# Patient Record
Sex: MALE | Race: WHITE | NOT HISPANIC OR LATINO
[De-identification: names, ages, dates, MRNs, and addresses within clinical notes are randomized per-mention and may not be internally consistent; named-entity substitution may affect disease eponyms.]

---

## 2017-01-05 ENCOUNTER — APPOINTMENT (OUTPATIENT)
Dept: GASTROENTEROLOGY | Facility: CLINIC | Age: 47
End: 2017-01-05

## 2017-01-19 ENCOUNTER — APPOINTMENT (OUTPATIENT)
Dept: GASTROENTEROLOGY | Facility: AMBULATORY MEDICAL SERVICES | Age: 47
End: 2017-01-19

## 2017-03-01 ENCOUNTER — APPOINTMENT (OUTPATIENT)
Dept: GASTROENTEROLOGY | Facility: CLINIC | Age: 47
End: 2017-03-01

## 2017-03-20 ENCOUNTER — RX RENEWAL (OUTPATIENT)
Age: 47
End: 2017-03-20

## 2018-01-02 ENCOUNTER — EMERGENCY (EMERGENCY)
Facility: HOSPITAL | Age: 48
LOS: 1 days | Discharge: HOME | End: 2018-01-02
Attending: EMERGENCY MEDICINE | Admitting: EMERGENCY MEDICINE
Payer: MEDICARE

## 2018-01-02 VITALS
TEMPERATURE: 99 F | HEIGHT: 68 IN | OXYGEN SATURATION: 96 % | WEIGHT: 190.04 LBS | RESPIRATION RATE: 20 BRPM | SYSTOLIC BLOOD PRESSURE: 152 MMHG | HEART RATE: 74 BPM | DIASTOLIC BLOOD PRESSURE: 85 MMHG

## 2018-01-02 VITALS
RESPIRATION RATE: 16 BRPM | TEMPERATURE: 98 F | OXYGEN SATURATION: 98 % | DIASTOLIC BLOOD PRESSURE: 74 MMHG | SYSTOLIC BLOOD PRESSURE: 129 MMHG | HEART RATE: 73 BPM

## 2018-01-02 PROCEDURE — 99283 EMERGENCY DEPT VISIT LOW MDM: CPT | Mod: 25

## 2018-01-02 PROCEDURE — 99283 EMERGENCY DEPT VISIT LOW MDM: CPT

## 2018-01-02 PROCEDURE — 96372 THER/PROPH/DIAG INJ SC/IM: CPT

## 2018-01-02 RX ORDER — ATORVASTATIN CALCIUM 80 MG/1
1 TABLET, FILM COATED ORAL
Qty: 0 | Refills: 0 | COMMUNITY

## 2018-01-02 RX ORDER — LISINOPRIL 2.5 MG/1
1 TABLET ORAL
Qty: 0 | Refills: 0 | COMMUNITY

## 2018-01-02 RX ORDER — MELOXICAM 15 MG/1
1 TABLET ORAL
Qty: 0 | Refills: 0 | COMMUNITY

## 2018-01-02 RX ORDER — KETOROLAC TROMETHAMINE 30 MG/ML
60 SYRINGE (ML) INJECTION ONCE
Qty: 0 | Refills: 0 | Status: DISCONTINUED | OUTPATIENT
Start: 2018-01-02 | End: 2018-01-02

## 2018-01-02 RX ADMIN — Medication 60 MILLIGRAM(S): at 10:46

## 2018-01-02 RX ADMIN — Medication 60 MILLIGRAM(S): at 11:16

## 2018-01-02 NOTE — ED PROVIDER NOTE - MEDICAL DECISION MAKING DETAILS
46 yo m with chronic back pain, relief with epidural inj 1&1/2 yrs ago, here with lbp x 4 days, worse since last night, no relief with meloxicam/aleve; no appt with pain management till 1/12; will give pain meds here, call pain management, re-assess

## 2018-01-02 NOTE — ED ADULT NURSE NOTE - OBJECTIVE STATEMENT
50 yr old male c/o of lower right back pain and soreness behind right knee x5 days. Pt took meloxicam 15mg at 0530 today; partial relief.

## 2018-01-02 NOTE — ED PROVIDER NOTE - NS CPE EDP MUSC LUMBAR LOC
tenderness/+diffuse lumbar spine ttp, worse to R paraspinal lumbar spine, no stepoffs/deformities/ecchymosis noted, +LROM secondary to pain, NVI

## 2018-01-02 NOTE — ED PROVIDER NOTE - OBJECTIVE STATEMENT
46 y/o M with hx of lumbar disc herniations, HTN, HLD presents with c/o lower back pain x 4 days. Pt states that pain is worse since last night, constant, sharp spasms from lower back radiating to R knee, worse with movement. States that he has been taking meloxicam and aleve without relief. States that he has seen pain management specialist, Dr. Maxwell in the past and had epidural injections (last inj was a 1&1/2 years ago) with relief. States that he called pain management today but next appt is not till 1/12. Denies numbness, tingling, focal weakness, bowel/bladder incontinence, recent trauma/fall, fever, chills, n/v or other symptoms. 46 y/o M with hx of lumbar disc herniations, HTN, HLD presents with c/o lower back pain x 4 days. Pt states that pain is worse since last night, constant, sharp spasms from lower back radiating to R knee, worse with movement. States that he has been taking meloxicam and aleve without relief. States that he has seen pain management specialist, Dr. Maxwell in the past and had epidural injections (last inj was 1&1/2 years ago) with relief. States that he called pain management today but next appt is not till 1/12. Denies numbness, tingling, focal weakness, bowel/bladder incontinence, recent trauma/fall, fever, chills, n/v or other symptoms.

## 2018-01-02 NOTE — ED PROVIDER NOTE - PROGRESS NOTE DETAILS
Attending Contribution to Care:  47 y.o M hx of herniated lumbar disc has had multiple epidural injections by Dr. Choe in the past c/o usual low back pain radiating to right leg. Pt has appt with pain mgt next week, took NSAIDs without improvement, requesting epidural injection today. PE negative except for lumbar pain. Neurologically intact. Plan- Toradol injection and arrange followup with Dr. Choe ASAP. Called pain management, Dr. Maxwell who advised to send pt upstairs to pain management office now to be evaluated.

## 2018-01-02 NOTE — ED ADULT NURSE NOTE - CHPI ED SYMPTOMS NEG
no neck tenderness/no constipation/no fatigue/no tingling/no anorexia/no difficulty bearing weight/no motor function loss/no bladder dysfunction/no bowel dysfunction/no numbness

## 2018-01-04 ENCOUNTER — OUTPATIENT (OUTPATIENT)
Dept: OUTPATIENT SERVICES | Facility: HOSPITAL | Age: 48
LOS: 1 days | End: 2018-01-04
Payer: MEDICARE

## 2018-01-04 DIAGNOSIS — M54.18 RADICULOPATHY, SACRAL AND SACROCOCCYGEAL REGION: ICD-10-CM

## 2018-01-04 PROCEDURE — 77003 FLUOROGUIDE FOR SPINE INJECT: CPT

## 2018-01-04 PROCEDURE — 62323 NJX INTERLAMINAR LMBR/SAC: CPT

## 2018-05-09 ENCOUNTER — RX RENEWAL (OUTPATIENT)
Age: 48
End: 2018-05-09

## 2018-05-10 ENCOUNTER — RX RENEWAL (OUTPATIENT)
Age: 48
End: 2018-05-10

## 2018-10-16 PROBLEM — E78.5 HYPERLIPIDEMIA, UNSPECIFIED: Chronic | Status: ACTIVE | Noted: 2018-01-02

## 2018-10-16 PROBLEM — I10 ESSENTIAL (PRIMARY) HYPERTENSION: Chronic | Status: ACTIVE | Noted: 2018-01-02

## 2018-10-22 ENCOUNTER — APPOINTMENT (OUTPATIENT)
Dept: GASTROENTEROLOGY | Facility: CLINIC | Age: 48
End: 2018-10-22
Payer: MEDICARE

## 2018-10-22 VITALS
BODY MASS INDEX: 30.16 KG/M2 | SYSTOLIC BLOOD PRESSURE: 128 MMHG | WEIGHT: 199 LBS | RESPIRATION RATE: 16 BRPM | HEART RATE: 76 BPM | DIASTOLIC BLOOD PRESSURE: 73 MMHG | OXYGEN SATURATION: 98 % | HEIGHT: 68 IN

## 2018-10-22 VITALS — TEMPERATURE: 98.3 F

## 2018-10-22 DIAGNOSIS — Z87.39 PERSONAL HISTORY OF OTHER DISEASES OF THE MUSCULOSKELETAL SYSTEM AND CONNECTIVE TISSUE: ICD-10-CM

## 2018-10-22 DIAGNOSIS — Z80.9 FAMILY HISTORY OF MALIGNANT NEOPLASM, UNSPECIFIED: ICD-10-CM

## 2018-10-22 DIAGNOSIS — Z86.69 PERSONAL HISTORY OF OTHER DISEASES OF THE NERVOUS SYSTEM AND SENSE ORGANS: ICD-10-CM

## 2018-10-22 DIAGNOSIS — Z12.11 ENCOUNTER FOR SCREENING FOR MALIGNANT NEOPLASM OF COLON: ICD-10-CM

## 2018-10-22 PROCEDURE — 99214 OFFICE O/P EST MOD 30 MIN: CPT

## 2018-10-22 RX ORDER — PANTOPRAZOLE 40 MG/1
40 TABLET, DELAYED RELEASE ORAL
Qty: 60 | Refills: 2 | Status: DISCONTINUED | COMMUNITY
Start: 2017-01-05 | End: 2018-10-22

## 2018-11-08 ENCOUNTER — APPOINTMENT (OUTPATIENT)
Dept: GASTROENTEROLOGY | Facility: AMBULATORY MEDICAL SERVICES | Age: 48
End: 2018-11-08
Payer: MEDICARE

## 2018-11-08 PROCEDURE — G0121 COLON CA SCRN NOT HI RSK IND: CPT

## 2018-12-31 ENCOUNTER — APPOINTMENT (OUTPATIENT)
Dept: GASTROENTEROLOGY | Facility: CLINIC | Age: 48
End: 2018-12-31

## 2020-05-26 ENCOUNTER — APPOINTMENT (OUTPATIENT)
Dept: PULMONOLOGY | Facility: CLINIC | Age: 50
End: 2020-05-26
Payer: MEDICARE

## 2020-05-26 VITALS
SYSTOLIC BLOOD PRESSURE: 138 MMHG | HEIGHT: 68 IN | BODY MASS INDEX: 31.07 KG/M2 | WEIGHT: 205 LBS | HEART RATE: 87 BPM | OXYGEN SATURATION: 97 % | TEMPERATURE: 98.2 F | DIASTOLIC BLOOD PRESSURE: 88 MMHG

## 2020-05-26 VITALS — SYSTOLIC BLOOD PRESSURE: 143 MMHG | DIASTOLIC BLOOD PRESSURE: 91 MMHG

## 2020-05-26 PROCEDURE — 99204 OFFICE O/P NEW MOD 45 MIN: CPT | Mod: CS,25

## 2020-05-26 PROCEDURE — 71046 X-RAY EXAM CHEST 2 VIEWS: CPT | Mod: CS

## 2020-05-26 RX ORDER — SODIUM SULFATE, POTASSIUM SULFATE, MAGNESIUM SULFATE 17.5; 3.13; 1.6 G/ML; G/ML; G/ML
17.5-3.13-1.6 SOLUTION, CONCENTRATE ORAL
Qty: 1 | Refills: 0 | Status: DISCONTINUED | COMMUNITY
Start: 2018-10-22 | End: 2020-05-26

## 2020-05-26 NOTE — PHYSICAL EXAM
[No Acute Distress] : no acute distress [Normal S1, S2] : normal s1, s2 [No Neck Mass] : no neck mass [Benign] : benign [Clear to Auscultation Bilaterally] : clear to auscultation bilaterally [Normal to Percussion] : normal to percussion [Not Tender] : not tender [No HSM] : no hsm [No Cyanosis] : no cyanosis [No Clubbing] : no clubbing [No Focal Deficits] : no focal deficits [No Edema] : no edema [Oriented x3] : oriented x3

## 2020-05-28 ENCOUNTER — APPOINTMENT (OUTPATIENT)
Dept: PULMONOLOGY | Facility: CLINIC | Age: 50
End: 2020-05-28

## 2020-05-29 ENCOUNTER — APPOINTMENT (OUTPATIENT)
Dept: PULMONOLOGY | Facility: CLINIC | Age: 50
End: 2020-05-29
Payer: MEDICARE

## 2020-05-29 VITALS
TEMPERATURE: 97.9 F | WEIGHT: 205 LBS | DIASTOLIC BLOOD PRESSURE: 91 MMHG | BODY MASS INDEX: 31.07 KG/M2 | HEIGHT: 68 IN | HEART RATE: 84 BPM | OXYGEN SATURATION: 100 % | SYSTOLIC BLOOD PRESSURE: 131 MMHG

## 2020-05-29 VITALS — SYSTOLIC BLOOD PRESSURE: 120 MMHG | DIASTOLIC BLOOD PRESSURE: 86 MMHG

## 2020-05-29 LAB
POCT - HEMOGLOBIN (HGB), QUANTITATIVE, TRANSCUTANEOUS: 14.5
SARS-COV-2 N GENE NPH QL NAA+PROBE: NOT DETECTED

## 2020-05-29 PROCEDURE — 94727 GAS DIL/WSHOT DETER LNG VOL: CPT | Mod: CS

## 2020-05-29 PROCEDURE — 88738 HGB QUANT TRANSCUTANEOUS: CPT | Mod: CS

## 2020-05-29 PROCEDURE — 94010 BREATHING CAPACITY TEST: CPT | Mod: CS

## 2020-05-29 PROCEDURE — 94729 DIFFUSING CAPACITY: CPT | Mod: CS

## 2020-05-29 NOTE — ASSESSMENT
[FreeTextEntry1] : For pulmonary function testing.\par Obtain labs.\par Repeat sleep study.\par Further recommendations after review of above.\par \par \par Add: Labs reported fine. \par 05/29/2020\par Pulmonary function testing\par Pulmonary function testing\par FEV1, FVC, and FEV1/FVC are within normal limits. There was not a significant response to inhaled bronchodilator. TLC is increased. FRC is normal. RV is normal. RV/TLC ratio is normal. Single breath diffusion capacity is normal. \par \par Reviewed with patient on May 29, 2020.  Lung function is normal.\par Increase exercise as tolerated.\par PRN Beta Agonist.\par \par \par Trial PRN Beta agonist. \par \par

## 2020-05-29 NOTE — DISCUSSION/SUMMARY
[FreeTextEntry1] : SOB of unclear etiology.  Significant findings on clinical examination or radiograph.  Lung function as well as laboratories pending.\par S/P COVID infection relatively mild.\par Obstructive sleep apnea syndrome.  No longer using CPAP therapy.  Was improved with weight loss but has regained weight.\par

## 2020-05-29 NOTE — CONSULT LETTER
[Dear  ___] : Dear ~JOSÉ, [Consult Letter:] : I had the pleasure of evaluating your patient, [unfilled]. [Please see my note below.] : Please see my note below. [Consult Closing:] : Thank you very much for allowing me to participate in the care of this patient.  If you have any questions, please do not hesitate to contact me. [Sincerely,] : Sincerely, [FreeTextEntry2] : Daniel Sánchez MD\par  [FreeTextEntry3] : Marvin Bone MD FCCP\par

## 2020-05-29 NOTE — HISTORY OF PRESENT ILLNESS
[Awakes Unrefreshed] : awakes unrefreshed [Fatigue] : fatigue [Nonrestorative Sleep] : nonrestorative sleep [Snoring] : snoring [TextBox_4] : SHARA ACOSTA is a 49 year old  M referred for pulmonary evaluation for COVID infection and shortness of breath.\par \par Patient was in usual state of health until 3/23 felt poorly. He and wife went to [facility]. Only wife tested told COVID positive. Assumed he had it also.\par Given zithro\par 4/26 went back for chest congestion. Swab told different coronavirus not COVID.\par Felt better.\par Still with cough and white mucous production. Cough now mild\par CC. SOB  predominantly  in evening. Can be sitting and watching TV.\par At beginning limited on treadmill. ? exercise limitation presently.\par \par Had CXR 4/26 [facility] told OK.\par \par Past pulmonary history. Pneumonia age 13 not hosp. Pneumonia age 46. Frequent bronchitis and sinus infections\par Occupational Exposure. N\par Family history of pulmonary disease. N\par Recent travel N\par Pets Dog not allergic\par \par \par Dx MAZIN about 2015 recc. CPAP\par Then lost wt. Had F/u study told [place]. Had lost 25 pounds regained 15-20.\par Had Poly. \par \par Antibody tested today.

## 2020-08-19 ENCOUNTER — APPOINTMENT (OUTPATIENT)
Dept: PULMONOLOGY | Facility: CLINIC | Age: 50
End: 2020-08-19
Payer: MEDICARE

## 2020-08-19 PROCEDURE — 95800 SLP STDY UNATTENDED: CPT

## 2020-08-20 PROCEDURE — 95800 SLP STDY UNATTENDED: CPT | Mod: 52

## 2020-08-31 ENCOUNTER — APPOINTMENT (OUTPATIENT)
Dept: PULMONOLOGY | Facility: CLINIC | Age: 50
End: 2020-08-31
Payer: MEDICARE

## 2020-08-31 VITALS
DIASTOLIC BLOOD PRESSURE: 78 MMHG | HEART RATE: 70 BPM | SYSTOLIC BLOOD PRESSURE: 133 MMHG | OXYGEN SATURATION: 98 % | TEMPERATURE: 98.3 F

## 2020-08-31 DIAGNOSIS — R06.02 SHORTNESS OF BREATH: ICD-10-CM

## 2020-08-31 DIAGNOSIS — R06.83 SNORING: ICD-10-CM

## 2020-08-31 PROCEDURE — 99213 OFFICE O/P EST LOW 20 MIN: CPT

## 2020-09-01 NOTE — PHYSICAL EXAM
[No Acute Distress] : no acute distress [No Neck Mass] : no neck mass [Normal S1, S2] : normal s1, s2 [Clear to Auscultation Bilaterally] : clear to auscultation bilaterally [Normal to Percussion] : normal to percussion [Benign] : benign [Not Tender] : not tender [No HSM] : no hsm [No Clubbing] : no clubbing [No Cyanosis] : no cyanosis [No Edema] : no edema [No Focal Deficits] : no focal deficits [Oriented x3] : oriented x3

## 2020-09-02 NOTE — HISTORY OF PRESENT ILLNESS
[TextBox_4] : Here for result of 2 night HHS\par  has had lab study in past and has a cpap machine that he uses nightly with help in daytime sleepiness\par \par Since last visit sob has significantly improved. Now only with mild sob with wearing mask.\par Does snore

## 2020-09-02 NOTE — ASSESSMENT
[FreeTextEntry1] : will send for lab attended poly.  Symptoms out of proportion to findings on home sleep study\par \par if no MAZIN will look into MAD

## 2020-12-16 PROBLEM — Z12.11 ENCOUNTER FOR SCREENING COLONOSCOPY: Status: RESOLVED | Noted: 2018-10-22 | Resolved: 2020-12-16

## 2021-06-25 ENCOUNTER — APPOINTMENT (OUTPATIENT)
Dept: PULMONOLOGY | Facility: CLINIC | Age: 51
End: 2021-06-25
Payer: MEDICARE

## 2021-06-25 PROCEDURE — 99213 OFFICE O/P EST LOW 20 MIN: CPT | Mod: 95

## 2021-06-25 NOTE — HISTORY OF PRESENT ILLNESS
[Home] : at home, [unfilled] , at the time of the visit. [Medical Office: ([facility])___] : at the medical office located in  [TextBox_4] : This visit was provided via telehealth using real-time 2-way audio visual technology. The patient, SHARA ACOSTA , was located at home, [address] [place] [address]\[place] [address]  at the time of the visit.\par The provider, Marvin Bone, was located at office [address] [place] [address] at the time of the visit. \par \par  The patient, Mr. SHARA ACOSTA  and Physician Marvin Lopes participated in the telehealth encounter.\par \par Verbal consent obtained by  from patient\par \par Feeling well\par Positive exercising\par Feeling much better.\par \par CPAP machine not working well\par 10 years old.\par Believes has auto A-PAP\par When CPAP machine working patient with excellent clinical response.\par \par [facility] can get new machine with note and old data.

## 2021-07-26 ENCOUNTER — NON-APPOINTMENT (OUTPATIENT)
Age: 51
End: 2021-07-26

## 2021-08-06 ENCOUNTER — APPOINTMENT (OUTPATIENT)
Dept: PULMONOLOGY | Facility: CLINIC | Age: 51
End: 2021-08-06
Payer: MEDICARE

## 2021-08-07 PROCEDURE — 95800 SLP STDY UNATTENDED: CPT | Mod: 52

## 2021-08-08 PROCEDURE — 95800 SLP STDY UNATTENDED: CPT

## 2021-08-20 ENCOUNTER — APPOINTMENT (OUTPATIENT)
Dept: PULMONOLOGY | Facility: CLINIC | Age: 51
End: 2021-08-20
Payer: MEDICARE

## 2021-08-20 PROCEDURE — 99213 OFFICE O/P EST LOW 20 MIN: CPT | Mod: 95

## 2021-08-23 NOTE — DISCUSSION/SUMMARY
[FreeTextEntry1] : \par \par Symptoms of sleep apnea disproportional to degree of abnormality on home sleep studies.\par Has history of documented sleep apnea on prior polysomnography.  Did respond to CPAP in the past.

## 2021-08-23 NOTE — ASSESSMENT
[FreeTextEntry1] : Recommend polysomnography for better evaluation of sleep disorder.\par Other options including mandibular advancement device or obtaining his own CPAP machine were discussed.\par We will proceed with polysomnography.

## 2021-08-23 NOTE — HISTORY OF PRESENT ILLNESS
[Home] : at home, [unfilled] , at the time of the visit. [Medical Office: ([facility])___] : at the medical office located in  [TextBox_4] : This visit was provided via telehealth using real-time 2-way audio visual technology. The patient, SHARA ACOSTA , was located at home, [address] [place] [address]  at the time of the visit.\par The provider, Marvin Bone, was located at office [address] [place] [address] at the time of the visit. \par \par  The patient, Mr. SHARA ACOSTA  and Physician Marvin Lopes participated in the telehealth encounter.\par \par Verbal consent obtained by  from patient\par

## 2021-11-30 ENCOUNTER — LABORATORY RESULT (OUTPATIENT)
Age: 51
End: 2021-11-30

## 2021-11-30 ENCOUNTER — APPOINTMENT (OUTPATIENT)
Dept: GASTROENTEROLOGY | Facility: CLINIC | Age: 51
End: 2021-11-30
Payer: MEDICARE

## 2021-11-30 ENCOUNTER — TRANSCRIPTION ENCOUNTER (OUTPATIENT)
Age: 51
End: 2021-11-30

## 2021-11-30 PROCEDURE — 99214 OFFICE O/P EST MOD 30 MIN: CPT

## 2021-11-30 NOTE — HISTORY OF PRESENT ILLNESS
[FreeTextEntry1] : Patient presents today for evaluation of rectal bleeding off and on for 1-2 months. Mostly on tissue and in toilet bowl water.Subsides on its own.No clots were seen.\par Hadd colonoscopy 5-6 yrs ago and reported hemorrhoids, otherwise normal\par  Patient is at average risk for colon cancer. No hx of IBD in family.Patient denies change in bowel movements, abdominal, pelvic or rectal discomfort.\par The patient denies family history of colon cancer or other gastrointestinal cancers.\par No NVCD or weight loss.Currently having regular BMs.  Good appetite.\par No cardiopulm issues.

## 2021-11-30 NOTE — PHYSICAL EXAM
[General Appearance - Alert] : alert [General Appearance - In No Acute Distress] : in no acute distress [Sclera] : the sclera and conjunctiva were normal [Outer Ear] : the ears and nose were normal in appearance [Examination Of The Oral Cavity] : the lips and gums were normal [Oropharynx] : the oropharynx was normal [Neck Appearance] : the appearance of the neck was normal [Neck Cervical Mass (___cm)] : no neck mass was observed [Jugular Venous Distention Increased] : there was no jugular-venous distention [Thyroid Diffuse Enlargement] : the thyroid was not enlarged [Thyroid Nodule] : there were no palpable thyroid nodules [Auscultation Breath Sounds / Voice Sounds] : lungs were clear to auscultation bilaterally [Heart Rate And Rhythm] : heart rate was normal and rhythm regular [Heart Sounds] : normal S1 and S2 [Heart Sounds Gallop] : no gallops [Murmurs] : no murmurs [Heart Sounds Pericardial Friction Rub] : no pericardial rub [Edema] : there was no peripheral edema [Bowel Sounds] : normal bowel sounds [Abdomen Soft] : soft [Abdomen Tenderness] : non-tender [Abdomen Mass (___ Cm)] : no abdominal mass palpated [Cervical Lymph Nodes Enlarged Posterior Bilaterally] : posterior cervical [Cervical Lymph Nodes Enlarged Anterior Bilaterally] : anterior cervical [Supraclavicular Lymph Nodes Enlarged Bilaterally] : supraclavicular [Axillary Lymph Nodes Enlarged Bilaterally] : axillary [Femoral Lymph Nodes Enlarged Bilaterally] : femoral [Inguinal Lymph Nodes Enlarged Bilaterally] : inguinal [No CVA Tenderness] : no ~M costovertebral angle tenderness [Abnormal Walk] : normal gait [Involuntary Movements] : no involuntary movements were seen [Skin Color & Pigmentation] : normal skin color and pigmentation [Skin Turgor] : normal skin turgor [] : no rash [Motor Exam] : the motor exam was normal [No Focal Deficits] : no focal deficits [Oriented To Time, Place, And Person] : oriented to person, place, and time

## 2021-12-02 ENCOUNTER — APPOINTMENT (OUTPATIENT)
Dept: GASTROENTEROLOGY | Facility: AMBULATORY MEDICAL SERVICES | Age: 51
End: 2021-12-02
Payer: MEDICARE

## 2021-12-02 PROCEDURE — 45380 COLONOSCOPY AND BIOPSY: CPT | Mod: PT

## 2021-12-16 ENCOUNTER — NON-APPOINTMENT (OUTPATIENT)
Age: 51
End: 2021-12-16

## 2022-02-01 ENCOUNTER — APPOINTMENT (OUTPATIENT)
Dept: PULMONOLOGY | Facility: CLINIC | Age: 52
End: 2022-02-01
Payer: MEDICARE

## 2022-02-01 VITALS
WEIGHT: 215 LBS | DIASTOLIC BLOOD PRESSURE: 88 MMHG | HEIGHT: 68 IN | TEMPERATURE: 97.8 F | OXYGEN SATURATION: 99 % | BODY MASS INDEX: 32.58 KG/M2 | SYSTOLIC BLOOD PRESSURE: 128 MMHG | HEART RATE: 75 BPM

## 2022-02-01 DIAGNOSIS — U07.1 COVID-19: ICD-10-CM

## 2022-02-01 DIAGNOSIS — M25.552 PAIN IN LEFT HIP: ICD-10-CM

## 2022-02-01 DIAGNOSIS — G47.33 OBSTRUCTIVE SLEEP APNEA (ADULT) (PEDIATRIC): ICD-10-CM

## 2022-02-01 PROCEDURE — 99214 OFFICE O/P EST MOD 30 MIN: CPT | Mod: CS

## 2022-02-01 RX ORDER — PEG-3350, SODIUM SULFATE, SODIUM CHLORIDE, POTASSIUM CHLORIDE, SODIUM ASCORBATE AND ASCORBIC ACID 7.5-2.691G
100 KIT ORAL
Qty: 1 | Refills: 0 | Status: DISCONTINUED | COMMUNITY
Start: 2021-11-30 | End: 2022-02-01

## 2022-02-01 RX ORDER — FAMOTIDINE 40 MG/1
40 TABLET, FILM COATED ORAL DAILY
Qty: 30 | Refills: 5 | Status: DISCONTINUED | COMMUNITY
Start: 2018-10-22 | End: 2022-02-01

## 2022-02-01 RX ORDER — DULOXETINE HYDROCHLORIDE 30 MG/1
30 CAPSULE, DELAYED RELEASE PELLETS ORAL
Refills: 0 | Status: ACTIVE | COMMUNITY
Start: 2022-02-01

## 2022-02-01 RX ORDER — ALBUTEROL SULFATE 90 UG/1
108 (90 BASE) INHALANT RESPIRATORY (INHALATION)
Qty: 1 | Refills: 5 | Status: DISCONTINUED | COMMUNITY
Start: 2020-05-29 | End: 2022-02-01

## 2022-02-01 RX ORDER — METOPROLOL SUCCINATE 25 MG/1
25 TABLET, EXTENDED RELEASE ORAL
Refills: 0 | Status: ACTIVE | COMMUNITY
Start: 2022-02-01

## 2022-02-01 NOTE — HISTORY OF PRESENT ILLNESS
[Never] : never [TextBox_4] : Dr Nathan Medrano [facility] 2/8/22 attention [place] [phone]\par going to Past and seeing Dr Sánchez cardio\par had repeat 2 night HHS last year , mild MAZIN\par does snore\par \par last used auto cpap until 1 year ago\par Preop for hip resurfacing or hip replacement.\par Denies cough wheezing or chest congestion no significant chest discomfort.\par \par Did have mild COVID in December 24 Th 2021 , lasted a 2 days with fatigue and low grade fever.  Symptoms resolved.\par Feeling well.

## 2022-02-01 NOTE — DISCUSSION/SUMMARY
[FreeTextEntry1] : excessive daytime sleepiness, feels better on cpap\par MAZIN in past.  Most recent studies with minimal apnea.\par Significant snoring\par Preop for hip replacement versus hip resurfacing.

## 2022-02-01 NOTE — ASSESSMENT
[FreeTextEntry1] : For possible dental evaluation.\par Consider polysomnography in the future.\par \par Pulmonary status maximized. Pulmonary function adequate to tolerate proposed surgical procedure. \par No pulmonary contraindications to surgery\par \par \par Pt with MAZIN. Anesthesia should be aware of MAZIN and take MAZIN precautions.\par

## 2022-02-01 NOTE — REASON FOR VISIT
[Follow-Up] : a follow-up visit [TextBox_44] : pulmonary clearance for left hip resurfacing potential hip replacemnet

## 2022-03-21 ENCOUNTER — APPOINTMENT (OUTPATIENT)
Dept: GASTROENTEROLOGY | Facility: CLINIC | Age: 52
End: 2022-03-21
Payer: MEDICARE

## 2022-03-21 VITALS
WEIGHT: 215 LBS | SYSTOLIC BLOOD PRESSURE: 134 MMHG | BODY MASS INDEX: 32.58 KG/M2 | HEIGHT: 68 IN | HEART RATE: 75 BPM | OXYGEN SATURATION: 98 % | DIASTOLIC BLOOD PRESSURE: 87 MMHG | TEMPERATURE: 97.4 F

## 2022-03-21 DIAGNOSIS — D64.9 ANEMIA, UNSPECIFIED: ICD-10-CM

## 2022-03-21 PROCEDURE — 99214 OFFICE O/P EST MOD 30 MIN: CPT

## 2022-03-21 RX ORDER — TRAMADOL HYDROCHLORIDE 50 MG/1
50 TABLET, COATED ORAL
Qty: 42 | Refills: 0 | Status: ACTIVE | COMMUNITY
Start: 2022-02-02

## 2022-03-21 RX ORDER — MELOXICAM 15 MG/1
15 TABLET ORAL
Qty: 29 | Refills: 0 | Status: ACTIVE | COMMUNITY
Start: 2022-02-02

## 2022-03-21 RX ORDER — ONDANSETRON 4 MG/1
4 TABLET ORAL
Qty: 28 | Refills: 0 | Status: ACTIVE | COMMUNITY
Start: 2022-02-02

## 2022-03-21 NOTE — ASSESSMENT
[FreeTextEntry1] : Patient is referred for anemia which may be due to his recent hip surgery.  He is on iron supplements.  Omeprazole was increased to 40 mg.  He denies any melena.  He had a recent colonoscopy that was normal except for large hemorrhoids.  He does have occasional bright red blood on the paper.\par CBC and iron studies were repeated.  Hemorrhoidal cream was sent to the lab.  He will continue omeprazole 40 mg once a day for now.

## 2022-03-21 NOTE — HISTORY OF PRESENT ILLNESS
[FreeTextEntry1] : Patient is a 51-year-old gentleman who was referred by Dr. Sánchez for anemia.  H/H is 11.6/37.3, MCV 85.  Patient underwent a left hip resurfacing procedure on 2/8.  He is undergoing rehab and is now using a cane instead of a walker.\par He does complain of occasional bright red blood on the paper and has a history of hemorrhoids.  He had a colonoscopy last December which revealed a small rectal polyp that was removed.  He does have a large internal and external hemorrhoids.  The colonoscopy was otherwise normal.\par He denies any heartburn.  His omeprazole was increased from 20 mg to 40 mg.  He was started on some iron supplements by his PMD.

## 2022-03-21 NOTE — PHYSICAL EXAM
[General Appearance - Alert] : alert [General Appearance - In No Acute Distress] : in no acute distress [Sclera] : the sclera and conjunctiva were normal [PERRL With Normal Accommodation] : pupils were equal in size, round, and reactive to light [Extraocular Movements] : extraocular movements were intact [Outer Ear] : the ears and nose were normal in appearance [Oropharynx] : the oropharynx was normal [Neck Appearance] : the appearance of the neck was normal [Neck Cervical Mass (___cm)] : no neck mass was observed [Jugular Venous Distention Increased] : there was no jugular-venous distention [Thyroid Diffuse Enlargement] : the thyroid was not enlarged [Thyroid Nodule] : there were no palpable thyroid nodules [Auscultation Breath Sounds / Voice Sounds] : lungs were clear to auscultation bilaterally [Heart Rate And Rhythm] : heart rate was normal and rhythm regular [Heart Sounds] : normal S1 and S2 [Heart Sounds Gallop] : no gallops [Murmurs] : no murmurs [Heart Sounds Pericardial Friction Rub] : no pericardial rub [Bowel Sounds] : normal bowel sounds [Abdomen Soft] : soft [Abdomen Tenderness] : non-tender [] : no hepato-splenomegaly [Abdomen Mass (___ Cm)] : no abdominal mass palpated [No CVA Tenderness] : no ~M costovertebral angle tenderness [No Spinal Tenderness] : no spinal tenderness [FreeTextEntry1] : Walks with cane [Oriented To Time, Place, And Person] : oriented to person, place, and time [Impaired Insight] : insight and judgment were intact [Affect] : the affect was normal

## 2022-04-19 ENCOUNTER — NON-APPOINTMENT (OUTPATIENT)
Age: 52
End: 2022-04-19

## 2022-05-09 LAB
BASOPHILS # BLD AUTO: 0.03 K/UL
BASOPHILS NFR BLD AUTO: 0.4 %
EOSINOPHIL # BLD AUTO: 0.13 K/UL
EOSINOPHIL NFR BLD AUTO: 1.7 %
FERRITIN SERPL-MCNC: 50 NG/ML
HCT VFR BLD CALC: 43.9 %
HGB BLD-MCNC: 13.7 G/DL
IMM GRANULOCYTES NFR BLD AUTO: 0.3 %
IRON SATN MFR SERPL: 14 %
IRON SERPL-MCNC: 51 UG/DL
LYMPHOCYTES # BLD AUTO: 2.82 K/UL
LYMPHOCYTES NFR BLD AUTO: 37.4 %
MAN DIFF?: NORMAL
MCHC RBC-ENTMCNC: 26.8 PG
MCHC RBC-ENTMCNC: 31.2 GM/DL
MCV RBC AUTO: 85.7 FL
MONOCYTES # BLD AUTO: 0.82 K/UL
MONOCYTES NFR BLD AUTO: 10.9 %
NEUTROPHILS # BLD AUTO: 3.73 K/UL
NEUTROPHILS NFR BLD AUTO: 49.3 %
PLATELET # BLD AUTO: 385 K/UL
RBC # BLD: 5.12 M/UL
RBC # FLD: 13.7 %
TIBC SERPL-MCNC: 364 UG/DL
UIBC SERPL-MCNC: 313 UG/DL
WBC # FLD AUTO: 7.55 K/UL

## 2022-05-16 ENCOUNTER — APPOINTMENT (OUTPATIENT)
Dept: GASTROENTEROLOGY | Facility: CLINIC | Age: 52
End: 2022-05-16

## 2022-05-16 RX ORDER — OMEPRAZOLE 20 MG/1
20 CAPSULE, DELAYED RELEASE ORAL TWICE DAILY
Qty: 180 | Refills: 3 | Status: ACTIVE | COMMUNITY
Start: 2022-02-01 | End: 1900-01-01

## 2022-11-23 ENCOUNTER — APPOINTMENT (OUTPATIENT)
Dept: GASTROENTEROLOGY | Facility: CLINIC | Age: 52
End: 2022-11-23

## 2022-11-23 VITALS
OXYGEN SATURATION: 98 % | WEIGHT: 185 LBS | DIASTOLIC BLOOD PRESSURE: 76 MMHG | HEIGHT: 68 IN | SYSTOLIC BLOOD PRESSURE: 130 MMHG | BODY MASS INDEX: 28.04 KG/M2 | TEMPERATURE: 97.7 F | HEART RATE: 71 BPM

## 2022-11-23 DIAGNOSIS — K64.5 PERIANAL VENOUS THROMBOSIS: ICD-10-CM

## 2022-11-23 DIAGNOSIS — K21.9 GASTRO-ESOPHAGEAL REFLUX DISEASE W/OUT ESOPHAGITIS: ICD-10-CM

## 2022-11-23 DIAGNOSIS — R10.13 EPIGASTRIC PAIN: ICD-10-CM

## 2022-11-23 DIAGNOSIS — K64.9 UNSPECIFIED HEMORRHOIDS: ICD-10-CM

## 2022-11-23 PROCEDURE — 99214 OFFICE O/P EST MOD 30 MIN: CPT

## 2022-11-23 RX ORDER — METHYLPREDNISOLONE 4 MG/1
4 TABLET ORAL
Qty: 21 | Refills: 0 | Status: ACTIVE | COMMUNITY
Start: 2022-06-14

## 2022-11-23 RX ORDER — OXYCODONE AND ACETAMINOPHEN 5; 325 MG/1; MG/1
5-325 TABLET ORAL
Qty: 15 | Refills: 0 | Status: DISCONTINUED | COMMUNITY
Start: 2022-02-02 | End: 2022-11-23

## 2022-11-23 RX ORDER — LIDOCAINE 5% 700 MG/1
5 PATCH TOPICAL
Qty: 30 | Refills: 0 | Status: ACTIVE | COMMUNITY
Start: 2022-11-21

## 2022-11-23 RX ORDER — TIZANIDINE 4 MG/1
4 TABLET ORAL
Qty: 60 | Refills: 0 | Status: ACTIVE | COMMUNITY
Start: 2022-11-21

## 2022-11-23 RX ORDER — DEXTROAMPHETAMINE SACCHARATE, AMPHETAMINE ASPARTATE MONOHYDRATE, DEXTROAMPHETAMINE SULFATE AND AMPHETAMINE SULFATE 7.5; 7.5; 7.5; 7.5 MG/1; MG/1; MG/1; MG/1
30 CAPSULE, EXTENDED RELEASE ORAL
Qty: 60 | Refills: 0 | Status: ACTIVE | COMMUNITY
Start: 2022-11-14

## 2022-11-23 RX ORDER — CYCLOBENZAPRINE HYDROCHLORIDE 10 MG/1
10 TABLET, FILM COATED ORAL
Qty: 30 | Refills: 0 | Status: ACTIVE | COMMUNITY
Start: 2022-11-21

## 2022-11-23 RX ORDER — HYDROCORTISONE ACETATE 25 MG/1
25 SUPPOSITORY RECTAL
Qty: 15 | Refills: 1 | Status: DISCONTINUED | COMMUNITY
Start: 2021-11-30 | End: 2022-11-23

## 2022-11-23 RX ORDER — NAPROXEN 500 MG/1
500 TABLET ORAL
Qty: 60 | Refills: 0 | Status: ACTIVE | COMMUNITY
Start: 2022-11-21

## 2022-11-23 RX ORDER — ASPIRIN 81 MG/1
81 TABLET, COATED ORAL
Qty: 57 | Refills: 0 | Status: DISCONTINUED | COMMUNITY
Start: 2022-02-02 | End: 2022-11-23

## 2022-11-23 RX ORDER — GABAPENTIN 300 MG/1
300 CAPSULE ORAL
Qty: 90 | Refills: 0 | Status: ACTIVE | COMMUNITY
Start: 2022-11-21

## 2022-11-23 NOTE — HISTORY OF PRESENT ILLNESS
[FreeTextEntry1] : Patient is a 52-year-old gentleman who was referred by Dr. Sánchez for anemia.  H/H is 11.6/37.3, MCV 85.  Patient underwent a left hip resurfacing procedure on 2/8/2022.  \par He does complain of occasional bright red blood on the paper and has a history of hemorrhoids.  He feels a lump in the anal area.  Initially it was somewhat tender.  It is now less tender.  Bleeding is less.  He had a colonoscopy last December which revealed a small rectal polyp that was removed.  He does have a large internal and external hemorrhoids.  The colonoscopy was otherwise normal.\par He does complain of some dyspepsia.  He does not take his acid reducing medication regularly.  He has no alarming symptoms.\par Blood work from April revealed iron/TIBC 51/364, percent iron saturation 14%, ferritin 50, H/H13.7/43.9, MCV 85.7.

## 2022-11-23 NOTE — REASON FOR VISIT
[Follow-up] : a follow-up of an existing diagnosis [FreeTextEntry1] : Lump anal area with some bleeding

## 2022-11-23 NOTE — PHYSICAL EXAM
[Alert] : alert [Normal Voice/Communication] : normal voice/communication [Healthy Appearing] : healthy appearing [No Acute Distress] : no acute distress [Sclera] : the sclera and conjunctiva were normal [Hearing Threshold Finger Rub Not [place]] : hearing was normal [Normal Lips/Gums] : the lips and gums were normal [Oropharynx] : the oropharynx was normal [Normal Appearance] : the appearance of the neck was normal [No Neck Mass] : no neck mass was observed [No Respiratory Distress] : no respiratory distress [No Acc Muscle Use] : no accessory muscle use [Respiration, Rhythm And Depth] : normal respiratory rhythm and effort [Auscultation Breath Sounds / Voice Sounds] : lungs were clear to auscultation bilaterally [Heart Rate And Rhythm] : heart rate was normal and rhythm regular [Normal S1, S2] : normal S1 and S2 [Murmurs] : no murmurs [Bowel Sounds] : normal bowel sounds [Abdomen Tenderness] : non-tender [No Masses] : no abdominal mass palpated [Abdomen Soft] : soft [] : no hepatosplenomegaly [Oriented To Time, Place, And Person] : oriented to person, place, and time [de-identified] : External hemorrhoids, Thrombosed hemorrhoid, non tender

## 2022-11-23 NOTE — ASSESSMENT
[FreeTextEntry1] : Patient with thrombosed hemorrhoid which is slowly improving.  He does have a history of hemorrhoids and has a large hemorrhoids.  He will be referred to surgery for possible treatment of the thrombosed hemorrhoid versus treatment of his hemorrhoids.  He will be given a hemorrhoidal cream.  He will be recommended to use sitz bath's.\par He will be recommended to take a PPI every other day.\par

## 2022-12-09 NOTE — ED PROVIDER NOTE - CROS ED MUSC ALL NEG
Routing refill request to provider for review/approval because:  Due for ACT  Stephanie Rose RN, BSN  [facility]        
- - -

## 2023-06-14 NOTE — ED PROVIDER NOTE - CHIEF COMPLAINT
Pt ambulatory to triage with mask in place. Pt complains on rash on his groin. Pt was seen here 3 weeks ago for same things and rx podofilox. Pt reports he has had no improvement with medication. Reports burning with rash. Reports he has been taking his BP medication regularly. The patient is a 47y Male complaining of back pain general. [FreeTextEntry1] : Depression screen time spent 5 minutes\par Blood collected in the office\par EKG, no changes from previous study\par Compliant with medication\par F/U in 4 mo

## 2023-08-14 ENCOUNTER — RX RENEWAL (OUTPATIENT)
Age: 53
End: 2023-08-14

## 2023-12-22 ENCOUNTER — APPOINTMENT (OUTPATIENT)
Dept: GASTROENTEROLOGY | Facility: CLINIC | Age: 53
End: 2023-12-22
Payer: MEDICARE

## 2023-12-22 VITALS
WEIGHT: 190 LBS | HEIGHT: 68 IN | BODY MASS INDEX: 28.79 KG/M2 | TEMPERATURE: 98 F | SYSTOLIC BLOOD PRESSURE: 135 MMHG | DIASTOLIC BLOOD PRESSURE: 83 MMHG | OXYGEN SATURATION: 100 % | HEART RATE: 79 BPM

## 2023-12-22 DIAGNOSIS — K62.5 HEMORRHAGE OF ANUS AND RECTUM: ICD-10-CM

## 2023-12-22 DIAGNOSIS — R79.0 ABNORMAL LVL OF BLOOD MINERAL: ICD-10-CM

## 2023-12-22 PROCEDURE — 99214 OFFICE O/P EST MOD 30 MIN: CPT

## 2023-12-22 RX ORDER — CEPHALEXIN 500 MG/1
500 CAPSULE ORAL
Qty: 3 | Refills: 0 | Status: DISCONTINUED | COMMUNITY
Start: 2022-02-02 | End: 2023-12-22

## 2023-12-22 RX ORDER — DEXTROAMPHETAMINE SACCHARATE, AMPHETAMINE ASPARTATE, DEXTROAMPHETAMINE SULFATE AND AMPHETAMINE SULFATE 2.5; 2.5; 2.5; 2.5 MG/1; MG/1; MG/1; MG/1
10 TABLET ORAL
Qty: 60 | Refills: 0 | Status: DISCONTINUED | COMMUNITY
Start: 2022-02-07 | End: 2023-12-22

## 2023-12-22 RX ORDER — HYDROCORTISONE 2.5% 25 MG/G
2.5 CREAM TOPICAL TWICE DAILY
Qty: 1 | Refills: 1 | Status: DISCONTINUED | COMMUNITY
Start: 2021-12-03 | End: 2023-12-22

## 2023-12-22 RX ORDER — DEXTROAMPHETAMINE SACCHARATE, AMPHETAMINE ASPARTATE, DEXTROAMPHETAMINE SULFATE AND AMPHETAMINE SULFATE 5; 5; 5; 5 MG/1; MG/1; MG/1; MG/1
20 TABLET ORAL
Qty: 60 | Refills: 0 | Status: DISCONTINUED | COMMUNITY
Start: 2022-03-14 | End: 2023-12-22

## 2023-12-22 NOTE — ASSESSMENT
[FreeTextEntry1] : As per Dr Santoyo recommendations patient will need iron studies and FOBT . Instructions provided to patient how to obtain sample and where to return specimen.    Patient will f/u with Dr Guadalupe next week after blood work and FOBT to determine if a EGD or Capsule endoscopy will be needed.   Patient verbalized understanding  of all information provided. All questions answered and reviewed. Per patient he is leaving for [place] in a week and would like to have his evaluation completed prior to.   I spent 35 minutes with the patient as well as reviewing documents prior to and after the office visit

## 2023-12-22 NOTE — PHYSICAL EXAM

## 2023-12-22 NOTE — HISTORY OF PRESENT ILLNESS
[FreeTextEntry1] : Patient is a 51-year-old gentleman who was referred by Dr. Sánchez for low ferritin of 12. H/H is 14.3/42.0, MCV 84.5.  He does complain of occasional bright red blood on the paper and has a history of hemorrhoids. He had a colonoscopy  December 2022 which revealed a small rectal polyp that was removed. He does have a large internal and external hemorrhoids. The colonoscopy was otherwise normal.  He denies any heartburn. His omeprazole was increased from 20 mg to 40 mg. He states is not taking iron supplements.  Last EGD 2017 revealed 2cm hiatal hernia, small sessile polyp found in the gastric fundus, fundic gland appearing

## 2023-12-26 ENCOUNTER — NON-APPOINTMENT (OUTPATIENT)
Age: 53
End: 2023-12-26

## 2023-12-26 LAB
FERRITIN SERPL-MCNC: 22 NG/ML
HEMOCCULT STL QL IA: NEGATIVE
IRON SATN MFR SERPL: 18 %
IRON SERPL-MCNC: 73 UG/DL
TIBC SERPL-MCNC: 401 UG/DL
UIBC SERPL-MCNC: 328 UG/DL

## 2023-12-27 ENCOUNTER — APPOINTMENT (OUTPATIENT)
Dept: GASTROENTEROLOGY | Facility: CLINIC | Age: 53
End: 2023-12-27

## 2024-12-04 ENCOUNTER — TRANSCRIPTION ENCOUNTER (OUTPATIENT)
Age: 54
End: 2024-12-04